# Patient Record
Sex: FEMALE | Race: OTHER | NOT HISPANIC OR LATINO | URBAN - METROPOLITAN AREA
[De-identification: names, ages, dates, MRNs, and addresses within clinical notes are randomized per-mention and may not be internally consistent; named-entity substitution may affect disease eponyms.]

---

## 2023-12-30 ENCOUNTER — EMERGENCY (EMERGENCY)
Facility: HOSPITAL | Age: 33
LOS: 1 days | Discharge: HOME | End: 2023-12-30
Attending: EMERGENCY MEDICINE | Admitting: EMERGENCY MEDICINE
Payer: SELF-PAY

## 2023-12-30 VITALS
SYSTOLIC BLOOD PRESSURE: 114 MMHG | DIASTOLIC BLOOD PRESSURE: 65 MMHG | HEART RATE: 75 BPM | OXYGEN SATURATION: 97 % | RESPIRATION RATE: 16 BRPM | TEMPERATURE: 98 F

## 2023-12-30 PROCEDURE — 99283 EMERGENCY DEPT VISIT LOW MDM: CPT

## 2023-12-30 PROCEDURE — 99053 MED SERV 10PM-8AM 24 HR FAC: CPT

## 2023-12-30 RX ORDER — ERYTHROMYCIN BASE 5 MG/GRAM
1 OINTMENT (GRAM) OPHTHALMIC (EYE) ONCE
Refills: 0 | Status: COMPLETED | OUTPATIENT
Start: 2023-12-30 | End: 2023-12-30

## 2023-12-30 RX ADMIN — Medication 1 APPLICATION(S): at 23:56

## 2023-12-30 RX ADMIN — Medication 100 MILLIGRAM(S): at 23:56

## 2023-12-30 NOTE — ED ADULT TRIAGE NOTE - DIRECT TO ROOM CARE INITIATED:
30-Dec-2023 23:05
You can access the [facility] Patient Portal, offered by [facility], by registering with the following website: [email or web address]

## 2023-12-30 NOTE — ED PROVIDER NOTE - PHYSICAL EXAMINATION
PE: A&Ox3, well appearing, NAD, NCAT, regular respiratory effort, moving all four extremities equally.  Eyes:  -Right: 20/30, IOP 11mmHg, clear conjunctiva, normal eyelid  -Left: 20/30, IOP 11mmHg, clear conjunctiva, erythema/mild swelling to the upper lid with a visible internal pustule  -Both: 20/20, EOMI
[facility]

## 2023-12-30 NOTE — ED PROVIDER NOTE - PATIENT PORTAL LINK FT
You can access the FollowMyHealth Patient Portal offered by [facility] by registering at the following website: [email or web address]. By joining [employer]’s FollowMyHealth portal, you will also be able to view your health information using other applications (apps) compatible with our system. You can access the FollowMyHealth Patient Portal offered by [facility] by registering at the following website: [email or web address]. By joining [employer]’s FollowMyHealth portal, you will also be able to view your health information using other applications (apps) compatible with our system.

## 2023-12-30 NOTE — ED PROVIDER NOTE - OBJECTIVE STATEMENT
33F no significant past medical history  presents with 5d of L eyelid swelling with a palpable "bump" that began after using a new mascara associated with occasional discharge. No change in vision; wear glasses.

## 2023-12-30 NOTE — ED PROVIDER NOTE - CLINICAL SUMMARY MEDICAL DECISION MAKING FREE TEXT BOX
33F no significant past medical history  presents with 5d of L eyelid swelling with a palpable "bump" that began after using a new mascara associated with occasional discharge. No change in vision; wear glasses.     PE: A&Ox3, well appearing, NAD, NCAT, regular respiratory effort, moving all four extremities equally.  Eyes:  -Right: 20/30, IOP 11mmHg, clear conjunctiva, normal eyelid  -Left: 20/30, IOP 11mmHg, clear conjunctiva, erythema/mild swelling to the upper lid with a visible internal pustule  -Both: 20/20, EOMI    MDM: patient here with signs and symptoms consistent with internal hordeolum. No signs of orbital cellulitis. Will provide patient with care instruction for style, ophtho resources, and rx for her stye.

## 2023-12-30 NOTE — ED PROVIDER NOTE - NSFOLLOWUPINSTRUCTIONS_ED_ALL_ED_FT
Please read all handouts provided to you from the emergency department. Seek immediate medical attention for any new/worsening signs or symptoms.  Take any prescribed medications as directed. Please follow up with  your primary physician in the next 3-5 days.    To access your record on the patient portal [facility], please visit:  [email or web address]  If you are having difficulties setting this up, call [phone] and someone can assist you over the phone.      [facility]  [address] [place] [address]  [phone]    [facility] [place] team of specialists are available for all eye emergencies 24/7, 365 days a year.    The [facility] Clinic is open for eye emergencies 7 days a week Monday - Friday between the hours of 8am and 7pm. On Saturdays, Sundays, and holidays the clinic is open between the hours of 8am and 4pm.    Between the hours of 7pm and 8am, patients with urgent and emergent eye conditions that require immediate attention should proceed to the [facility] Emergency Department located at [facility] and [address] [place] [address], or to the nearest Emergency Department.     Stye    WHAT YOU NEED TO KNOW:    A stye is a lump on the edge or inside of your eyelid caused by an infection. A stye can form on your upper or lower eyelid. It usually goes away in 2 to 4 days.    DISCHARGE INSTRUCTIONS:    Follow up with your doctor as directed: Write down your questions so you remember to ask them during your visits.     Self-care:   •Use warm compresses: This will help decrease swelling and pain. Wet a clean washcloth with warm water and place it on your eye for 10 to 15 minutes, 3 to 4 times each day or as directed.    •Keep your hands away from your eye: This helps to prevent the spread of the infection to other parts of the eye. Wash your hands often with soap and dry with a clean towel. Do not squeeze the stye.     •Do not use eye makeup: Do not wear eye makeup while you have a stye. Eye makeup may carry bacteria and cause another stye. Throw away eye makeup and brushes used to apply the makeup. Use new eye makeup after the stye has gone away. Do not share eye makeup with others  .  •Prevent another stye: Wash your face and clean your eyelashes every day. Remove eye makeup with makeup remover. This helps to completely remove eye makeup without heavy rubbing.     Contact your healthcare provider if:   •You have redness and discharge around your eye, and your eye pain is getting worse.  •Your vision changes.  •The stye has not gone away within 7 days.   •The stye comes back within a short period of time after treatment.  •You have questions or concerns about your condition or care. Please read all handouts provided to you from the emergency department. Seek immediate medical attention for any new/worsening signs or symptoms.  Take any prescribed medications as directed. Please follow up with  your primary physician in the next 3-5 days.    To access your record on the patient portal [facility], please visit:  [email or web address]  If you are having difficulties setting this up, call [phone] and someone can assist you over the phone.      [facility]  [address] [place] [address]  [phone]    [facility] [place] team [facility] are available for all eye emergencies 24/7, 365 days a year.    The Eye Clinic is open for eye emergencies 7 days a week Monday - Friday between the hours of 8am and 7pm. On Saturdays, Sundays, and holidays the clinic is open between the hours of 8am and 4pm.    Between the hours of 7pm and 8am, patients with urgent and emergent eye conditions that require immediate attention should proceed to the [facility] Emergency Department located at [address] and [address] [place] [address], or to the nearest Emergency Department.     Stye    WHAT YOU NEED TO KNOW:    A stye is a lump on the edge or inside of your eyelid caused by an infection. A stye can form on your upper or lower eyelid. It usually goes away in 2 to 4 days.    DISCHARGE INSTRUCTIONS:    Follow up with your doctor as directed: Write down your questions so you remember to ask them during your visits.     Self-care:   •Use warm compresses: This will help decrease swelling and pain. Wet a clean washcloth with warm water and place it on your eye for 10 to 15 minutes, 3 to 4 times each day or as directed.    •Keep your hands away from your eye: This helps to prevent the spread of the infection to other parts of the eye. Wash your hands often with soap and dry with a clean towel. Do not squeeze the stye.     •Do not use eye makeup: Do not wear eye makeup while you have a stye. Eye makeup may carry bacteria and cause another stye. Throw away eye makeup and brushes used to apply the makeup. Use new eye makeup after the stye has gone away. Do not share eye makeup with others  .  •Prevent another stye: Wash your face and clean your eyelashes every day. Remove eye makeup with makeup remover. This helps to completely remove eye makeup without heavy rubbing.     Contact your healthcare provider if:   •You have redness and discharge around your eye, and your eye pain is getting worse.  •Your vision changes.  •The stye has not gone away within 7 days.   •The stye comes back within a short period of time after treatment.  •You have questions or concerns about your condition or care.

## 2023-12-31 RX ORDER — CEFUROXIME AXETIL 250 MG
1 TABLET ORAL
Qty: 14 | Refills: 0
Start: 2023-12-31 | End: 2024-01-06

## 2023-12-31 NOTE — ED ADULT NURSE NOTE - OBJECTIVE STATEMENT
A&O X4, c/o hive, itchiness, and iritation to L-eyelid x 5 days. Able to open eyes fully, denies any visual change. No acute distress noted.

## 2023-12-31 NOTE — ED ADULT NURSE NOTE - NSFALLUNIVINTERV_ED_ALL_ED
Bed/Stretcher in lowest position, wheels locked, appropriate side rails in place/Call bell, personal items and telephone in reach/Instruct patient to call for assistance before getting out of bed/chair/stretcher/Non-slip footwear applied when patient is off stretcher/[place] to call system/Physically safe environment - no spills, clutter or unnecessary equipment/Purposeful proactive rounding/Room/bathroom lighting operational, light cord in reach Bed/Stretcher in lowest position, wheels locked, appropriate side rails in place/Call bell, personal items and telephone in reach/Instruct patient to call for assistance before getting out of bed/chair/stretcher/Non-slip footwear applied when patient is off stretcher/[place] to call system/Physically safe environment - no spills, clutter or unnecessary equipment/Purposeful proactive rounding/Room/bathroom lighting operational, light cord in reach

## 2023-12-31 NOTE — ED ADULT NURSE NOTE - CHPI ED NUR SYMPTOMS NEG
no blurred vision/no discharge/no double vision/no drainage/no foreign body/no photophobia/no purulent drainage

## 2024-01-02 DIAGNOSIS — H00.024 HORDEOLUM INTERNUM LEFT UPPER EYELID: ICD-10-CM

## 2024-01-02 DIAGNOSIS — H57.12 OCULAR PAIN, LEFT EYE: ICD-10-CM

## 2024-03-19 NOTE — ED ADULT TRIAGE NOTE - CCCP TRG CHIEF CMPLNT
Medicare Wellness Visit  Plan for Preventive Care    A good way for you to stay healthy is to use preventive care.  Medicare covers many services that can help you stay healthy.* The goal of these services is to find any health problems as quickly as possible. Finding problems early can help make them easier to treat.  Your personal plan below lists the services you may need and when they are due.      Health Maintenance Summary       DTaP/Tdap/Td Vaccine (1 - Tdap)  Overdue - never done    COVID-19 Vaccine (5 - 2023-24 season)  Overdue since 9/1/2023    Medicare Advantage- Medicare Wellness Visit (Yearly - January to December)  Due since 1/1/2024    Depression Screening (Yearly)  Next due on 3/19/2025    Osteoporosis Screening   Completed    Hepatitis C Screening   Completed    Pneumococcal Vaccine 65+   Completed    Shingles Vaccine   Completed    Influenza Vaccine   Completed    Meningococcal Vaccine   Aged Out    Hepatitis B Vaccine (For Physician/APC Discussion)   Aged Out    HPV Vaccine   Aged Out    Breast Cancer Screening   Discontinued    Colorectal Cancer Screen-   Ordered on 1/28/2019             Preventive Care for Women and Men    Heart Screenings (Cardiovascular):  Blood tests are used to check your cholesterol, lipid and triglyceride levels. High levels can increase your risk for heart disease and stroke. High levels can be treated with medications, diet and exercise. Lowering your levels can help keep your heart and blood vessels healthy.  Your provider will order these tests if they are needed.    An ultrasound is done to see if you have an abdominal aortic aneurysm (AAA).  This is an enlargement of one of the main blood vessels that delivers blood to the body.   In the [place], 9,000 deaths are caused by AAA.  You may not even know you have this problem and as many as 1 in 3 people will have a serious problem if it is not treated.  Early diagnosis allows for more effective treatment and  cure.  If you have a family history of AAA or are a male age 65-75 who has smoked, you are at higher risk of an AAA.  Your provider can order this test, if needed.    Colorectal Screening:  There are many tests that are used to check for cancer of your colon and rectum. You and your provider should discuss what test is best for you and when to have it done.  Options include:  Screening Colonoscopy: exam of the entire colon, seen through a flexible lighted tube.  Flexible Sigmoidoscopy: exam of the last third (sigmoid portion) of the colon and rectum, seen through a flexible lighted tube.  Cologuard DNA stool test: a sample of your stool is used to screen for cancer and unseen blood in your stool.  Fecal Occult Blood Test: a sample of your stool is studied to find any unseen blood    Flu Shot:  An immunization that helps to prevent influenza (the flu). You should get this every year. The best time to get the shot is in the fall.    Pneumococcal Shot:  Vaccines help prevent pneumococcal disease, which is any type of illness caused by Streptococcus pneumoniae bacteria. There are two kinds of pneumococcal vaccines available in the [place]:   Pneumococcal conjugate vaccines (PCV20 or Tzyibwg42®)  Pneumococcal polysaccharide vaccine (PPSV23 or Tvbsktplu43®)  For those who have never received any pneumococcal conjugate vaccine, CDC recommends PVC20 for adults 65 years or older and adults 19 through 64 years old with certain medical conditions or risk factors.   For those who have previously received PCV13, this should be followed by a dose of PPSV23.     Hepatitis B Shot:  An immunization that helps to protect people from getting Hepatitis B. Hepatitis B is a virus that spreads through contact with infected blood or body fluids. Many people with the virus do not have symptoms.  The virus can lead to serious problems, such as liver disease. Some people are at higher risk than others. Your doctor will tell you if you  need this shot.     Diabetes Screening:  A test to measure sugar (glucose) in your blood is called a fasting blood sugar. Fasting means you cannot have food or drink for at least 8 hours before the test. This test can detect diabetes long before you may notice symptoms.    Glaucoma Screening:  Glaucoma screening is performed by your eye doctor. The test measures the fluid pressure inside your eyes to determine if you have glaucoma.     Hepatitis C Screening:  A blood test to see if you have the hepatitis C virus.  Hepatitis C attacks the liver and is a major cause of chronic liver disease.  Medicare will cover a single screening for all adults born between 1945 & 1965, or high risk patients (people who have injected illegal drugs or people who have had blood transfusions).  High risk patients who continue to inject illegal drugs can be screened for Hepatitis C every year.    Smoking and Tobacco-Use Cessation Counseling:  Tobacco is the single greatest cause of disease and early death in our country today. Medication and counseling together can increase a person’s chance of quitting for good.   Medicare covers two quitting attempts per year, with four counseling sessions per attempt (eight sessions in a 12 month period)    Preventive Screening tests for Women    Screening Mammograms and Breast Exams:  An x-ray of your breasts to check for breast cancer before you or your doctor may be able to feel it.  If breast cancer is found early it can usually be treated with success.    Pelvic Exams and Pap Tests:  An exam to check for cervical and vaginal cancer. A Pap test is a lab test in which cells are taken from your cervix and sent to the lab to look for signs of cervical cancer. If cancer of the cervix is found early, chances for a cure are good. Testing can generally end at age 65, or if a woman has a hysterectomy for a benign condition. Your provider may recommend more frequent testing if certain abnormal results are  found.    Bone Mass Measurements:  A painless x-ray of your bone density to see if you are at risk for a broken bone. Bone density refers to the thickness of bones or how tightly the bone tissue is packed.    Preventive Screening tests for Men    Prostate Screening:  Should you have a prostate cancer test (PSA)?  It is up to you to decide if you want a prostate cancer test. Talk to your clinician to find out if the test is right for you.  Things for you to consider and talk about should include:  Benefits and harms of the test  Your family history  How your race/ethnicity may influence the test  If the test may impact other medical conditions you have  Your values on screenings and treatments    *Medicare pays for many preventive services to keep you healthy. For some of these services, you might have to pay a deductible, coinsurance, and / or copayment.  The amounts vary depending on the type of services you need and the kind of Medicare health plan you have.    For further details on screenings offered by Medicare please visit: [email or web address].gov/coverage/preventive-[email or web address]-services      eye pain traumatic